# Patient Record
Sex: MALE | Race: WHITE | NOT HISPANIC OR LATINO | Employment: PART TIME | URBAN - METROPOLITAN AREA
[De-identification: names, ages, dates, MRNs, and addresses within clinical notes are randomized per-mention and may not be internally consistent; named-entity substitution may affect disease eponyms.]

---

## 2020-07-25 ENCOUNTER — HOSPITAL ENCOUNTER (EMERGENCY)
Facility: HOSPITAL | Age: 30
Discharge: HOME | End: 2020-07-25
Attending: EMERGENCY MEDICINE
Payer: MEDICAID

## 2020-07-25 VITALS
WEIGHT: 130 LBS | OXYGEN SATURATION: 99 % | DIASTOLIC BLOOD PRESSURE: 60 MMHG | RESPIRATION RATE: 18 BRPM | HEART RATE: 56 BPM | TEMPERATURE: 98 F | BODY MASS INDEX: 21.66 KG/M2 | SYSTOLIC BLOOD PRESSURE: 122 MMHG | HEIGHT: 65 IN

## 2020-07-25 DIAGNOSIS — R05.9 COUGH: ICD-10-CM

## 2020-07-25 DIAGNOSIS — R11.2 NON-INTRACTABLE VOMITING WITH NAUSEA, UNSPECIFIED VOMITING TYPE: ICD-10-CM

## 2020-07-25 DIAGNOSIS — Z20.822 SUSPECTED COVID-19 VIRUS INFECTION: Primary | ICD-10-CM

## 2020-07-25 PROCEDURE — 25000003 PHARM REV CODE 250: Performed by: NURSE PRACTITIONER

## 2020-07-25 PROCEDURE — U0003 INFECTIOUS AGENT DETECTION BY NUCLEIC ACID (DNA OR RNA); SEVERE ACUTE RESPIRATORY SYNDROME CORONAVIRUS 2 (SARS-COV-2) (CORONAVIRUS DISEASE [COVID-19]), AMPLIFIED PROBE TECHNIQUE, MAKING USE OF HIGH THROUGHPUT TECHNOLOGIES AS DESCRIBED BY CMS-2020-01-R: HCPCS

## 2020-07-25 PROCEDURE — 99284 EMERGENCY DEPT VISIT MOD MDM: CPT

## 2020-07-25 RX ORDER — ONDANSETRON 4 MG/1
4 TABLET, FILM COATED ORAL EVERY 6 HOURS PRN
Qty: 12 TABLET | Refills: 0 | Status: SHIPPED | OUTPATIENT
Start: 2020-07-25

## 2020-07-25 RX ORDER — ONDANSETRON 4 MG/1
4 TABLET, ORALLY DISINTEGRATING ORAL
Status: COMPLETED | OUTPATIENT
Start: 2020-07-25 | End: 2020-07-25

## 2020-07-25 RX ORDER — ACETAMINOPHEN 325 MG/1
650 TABLET ORAL
Status: COMPLETED | OUTPATIENT
Start: 2020-07-25 | End: 2020-07-25

## 2020-07-25 RX ORDER — BENZONATATE 100 MG/1
100 CAPSULE ORAL 3 TIMES DAILY PRN
Qty: 20 CAPSULE | Refills: 0 | Status: SHIPPED | OUTPATIENT
Start: 2020-07-25 | End: 2020-08-04

## 2020-07-25 RX ADMIN — ONDANSETRON 4 MG: 4 TABLET, ORALLY DISINTEGRATING ORAL at 03:07

## 2020-07-25 RX ADMIN — ACETAMINOPHEN 650 MG: 325 TABLET ORAL at 03:07

## 2020-07-25 NOTE — ED TRIAGE NOTES
Pt presenting to the ED c/o not being able to taste or smell anything. Pt states that he has been running a fever, sweating, chills, and headaches. Pt states that symptoms have been present for 4 days. Pt states that he has been taking Advil to relieve pain.

## 2020-07-25 NOTE — ED PROVIDER NOTES
Encounter Date: 7/25/2020       History     Chief Complaint   Patient presents with    Fever     all symptoms started x 4 days ago    Vomiting    Cough    Nasal Congestion    loss of taste     CC:  COVID symptoms    HPI:  This is a 29-year-old male with no medical problems presenting to the ED for 4 day history loss of smell and taste, subjective fever, cough, nasal congestion, emesis.  He is concerned he has COVID-19.  His wife tested positive 2 weeks ago.  He has attempted no treatment prior to arrival.    The history is provided by the patient. No  was used.     Review of patient's allergies indicates:  No Known Allergies  No past medical history on file.  No past surgical history on file.  No family history on file.  Social History     Tobacco Use    Smoking status: Not on file   Substance Use Topics    Alcohol use: Not on file    Drug use: Not on file     Review of Systems   Constitutional: Positive for fever. Negative for chills.   HENT: Positive for congestion. Negative for sore throat.    Respiratory: Positive for cough. Negative for shortness of breath.    Cardiovascular: Negative for chest pain.   Gastrointestinal: Positive for nausea and vomiting. Negative for abdominal pain.   Genitourinary: Negative for dysuria.   Musculoskeletal: Negative for back pain.   Skin: Negative for rash.   Neurological: Positive for headaches. Negative for weakness.   Hematological: Does not bruise/bleed easily.       Physical Exam     Initial Vitals [07/25/20 1349]   BP Pulse Resp Temp SpO2   (!) 121/58 67 18 98.3 °F (36.8 °C) 97 %      MAP       --         Physical Exam    Vitals reviewed.  Constitutional: He appears well-developed and well-nourished. He is not diaphoretic.  Non-toxic appearance. He does not have a sickly appearance. He does not appear ill. No distress.   HENT:   Head: Normocephalic and atraumatic.   Right Ear: External ear normal.   Left Ear: External ear normal.   Neck: Normal  range of motion.   Cardiovascular: Normal rate, regular rhythm, normal heart sounds and intact distal pulses.   Pulmonary/Chest: No respiratory distress.   Speaking in full and clear sentences without dyspnea or pause.  No tachypnea.   Musculoskeletal: Normal range of motion.   Neurological: He is alert and oriented to person, place, and time. GCS eye subscore is 4. GCS verbal subscore is 5. GCS motor subscore is 6.   Skin: Skin is warm, dry and intact. No rash noted. No erythema.   Psychiatric: He has a normal mood and affect. Thought content normal.         ED Course   Procedures  Labs Reviewed   SARS-COV-2 (COVID-19) QUALITATIVE PCR          Imaging Results    None          Medical Decision Making:   ED Management:  This is an emergent evaluation of a healthy 29-year-old male presenting to the ED for URI symptoms. Presentation suspicious for COVID19; test pending. No SOB, respiratory distress, or hypoxia. Vitals normalized and stable. Remains well appearing while in ED.     Sent home with supportive care. We discuss home quarantine . Advising follow-up with PCP. Strict return precautions discussed with patient who is agreeable to the plan.    I discussed with the patient the diagnosis, treatment plan, indications for return to the emergency department, and for expected follow-up. The patient verbalized an understanding. The patient is asked if there are any questions or concerns. We discuss the case, until all issues are addressed to the patients satisfaction. Patient understands and is agreeable to the plan.                                  Clinical Impression:       ICD-10-CM ICD-9-CM   1. Suspected Covid-19 Virus Infection  R68.89    2. Cough  R05 786.2   3. Non-intractable vomiting with nausea, unspecified vomiting type  R11.2 787.01         Disposition:   Disposition: Discharged  Condition: Stable

## 2020-07-25 NOTE — DISCHARGE INSTRUCTIONS

## 2020-07-26 LAB — SARS-COV-2 RNA RESP QL NAA+PROBE: DETECTED

## 2020-07-27 DIAGNOSIS — U07.1 COVID-19 VIRUS DETECTED: ICD-10-CM
